# Patient Record
Sex: FEMALE | Race: AMERICAN INDIAN OR ALASKA NATIVE
[De-identification: names, ages, dates, MRNs, and addresses within clinical notes are randomized per-mention and may not be internally consistent; named-entity substitution may affect disease eponyms.]

---

## 2018-06-28 ENCOUNTER — HOSPITAL ENCOUNTER (OUTPATIENT)
Dept: HOSPITAL 5 - PET | Age: 48
Discharge: HOME | End: 2018-06-28
Attending: INTERNAL MEDICINE
Payer: OTHER GOVERNMENT

## 2018-06-28 DIAGNOSIS — D35.02: ICD-10-CM

## 2018-06-28 DIAGNOSIS — R91.1: Primary | ICD-10-CM

## 2018-06-28 DIAGNOSIS — I50.9: ICD-10-CM

## 2018-06-28 DIAGNOSIS — E66.9: ICD-10-CM

## 2018-06-28 DIAGNOSIS — K42.9: ICD-10-CM

## 2018-06-28 PROCEDURE — 82962 GLUCOSE BLOOD TEST: CPT

## 2018-06-28 PROCEDURE — 78815 PET IMAGE W/CT SKULL-THIGH: CPT

## 2018-06-28 PROCEDURE — A9552 F18 FDG: HCPCS

## 2018-06-29 NOTE — PET REPORT
PET/CT:06/28/18 06:42:00



CLINICAL: Chest mass.

RADIOPHARMACEUTICAL: 15.11mCi F18-FDG.





COMPARISON: 05/05/18 CT Chest



TECHNIQUE-

Following intravenous injection of F-18 FDG and an approximately 60 

minute uptake period,  CT and PET images from the mid skull to the 

upper thighs were acquired with the patient in the fasted state.  No 

contrast was administered.  The CT protocol used for this PET CT study 

is designed for attenuation correction and anatomic localization of PET 

abnormalities.  This  CT is not desired to produce and cannot 

replace, state-of-the-art diagnostic CT scans with specific imaging 

protocols for different body parts and indications.



Plasma glucose at the time of this test: 88g/dl.



The standardized uptake values (SUV) are normalized to patient body 

weight and indicate the highest activity concentration (SUV max) in a 

given disease site.





FINDINGS:



Brain--Physiologic FDG uptake in the visualized regions of the brain.



Neck--Physiologic FDG uptake in mucosal structures and no abnormal 

uptake.  Non-FDG avid subcentimeter submental lymph nodes.  No 

lymphadenopathy.



Chest--Physiologic FDG uptake in mediastinal blood pool and myocardium.



Lungs--No abnormal uptake.  No pulmonary nodule.  The previously 

described right lower lobe paraspinal smooth mass is non-FDG avid and 

measures 40 Hounsfield units density.  It measures 2.7 x 2.3 x 2.4 cm.  

No other chest mass.



Pleura/pericardium--No abnormal uptake.



Thoracic nodes--No abnormal uptake.



Hepatobiliary--No abnormal uptake.  Liver background SUV mean, as a 

reference for comparing FDG studies, is 4.61 .  No liver mass.

      

Spleen--No abnormal uptake.



Pancreas--No abnormal uptake.



Adrenal Glands--No abnormal uptake.  A round smooth 1.6 x 1.6 cm 

hypodense left adrenal nodule is non-FDG avid and measures seven 

Hounsfield units in density.





Kidneys/Ureters/Bladder--No abnormal uptake.



Abdominopelvic Nodes--No abnormal uptake.



Bowel/Peritoneum/Mesentery--No abnormal uptake.



Pelvic organs--No abnormal uptake.



Bones/Soft Tissues--No abnormal uptake.  No suspicious bone lesion.



Other findings: A fat containing umbilical hernia.





IMPRESSION-1.  A benign 2.4 cm right lower lobe paraspinal mass.  

Esophageal duplication cyst or bronchogenic cyst the most likely 

considerations.  2.  No pulmonary nodule or mass.  3.  A benign 1.6 cm 

left adrenal adenoma.